# Patient Record
Sex: FEMALE | Race: BLACK OR AFRICAN AMERICAN | NOT HISPANIC OR LATINO | Employment: FULL TIME | URBAN - METROPOLITAN AREA
[De-identification: names, ages, dates, MRNs, and addresses within clinical notes are randomized per-mention and may not be internally consistent; named-entity substitution may affect disease eponyms.]

---

## 2023-03-06 LAB
ALBUMIN (MG/L) IN URINE: 172.5 MG/L
ALBUMIN/CREATININE (UG/MG) IN URINE: 126.8 UG/MG CRT (ref 0–30)
CREATININE (MG/DL) IN URINE: 136 MG/DL (ref 20–320)

## 2023-03-27 DIAGNOSIS — Z00.00 ENCOUNTER FOR GENERAL ADULT MEDICAL EXAMINATION WITHOUT ABNORMAL FINDINGS: ICD-10-CM

## 2023-03-27 DIAGNOSIS — E10.65 TYPE 1 DIABETES MELLITUS WITH HYPERGLYCEMIA (MULTI): ICD-10-CM

## 2023-03-27 DIAGNOSIS — E78.5 HYPERLIPIDEMIA, UNSPECIFIED: ICD-10-CM

## 2023-03-27 RX ORDER — LOVASTATIN 20 MG/1
TABLET ORAL
Qty: 90 TABLET | Refills: 1 | Status: SHIPPED | OUTPATIENT
Start: 2023-03-27 | End: 2023-10-03 | Stop reason: SDUPTHER

## 2023-03-27 RX ORDER — ASPIRIN 81 MG/1
TABLET ORAL
Qty: 90 TABLET | Refills: 1 | Status: SHIPPED | OUTPATIENT
Start: 2023-03-27 | End: 2023-11-28 | Stop reason: SDUPTHER

## 2023-03-27 RX ORDER — LISINOPRIL 2.5 MG/1
TABLET ORAL
Qty: 90 TABLET | Refills: 2 | Status: SHIPPED | OUTPATIENT
Start: 2023-03-27 | End: 2023-10-03 | Stop reason: SDUPTHER

## 2023-05-07 DIAGNOSIS — M54.42 LUMBAGO WITH SCIATICA, LEFT SIDE: ICD-10-CM

## 2023-05-07 DIAGNOSIS — G89.29 OTHER CHRONIC PAIN: ICD-10-CM

## 2023-05-10 RX ORDER — DICLOFENAC SODIUM 10 MG/G
GEL TOPICAL
Qty: 100 G | Refills: 4 | Status: SHIPPED | OUTPATIENT
Start: 2023-05-10

## 2023-07-20 DIAGNOSIS — G89.29 OTHER CHRONIC PAIN: ICD-10-CM

## 2023-07-20 DIAGNOSIS — M54.42 LUMBAGO WITH SCIATICA, LEFT SIDE: ICD-10-CM

## 2023-07-20 RX ORDER — LIDOCAINE 50 MG/G
PATCH TOPICAL
Qty: 30 PATCH | Refills: 1 | Status: SHIPPED | OUTPATIENT
Start: 2023-07-20 | End: 2023-10-03 | Stop reason: SDUPTHER

## 2023-10-03 ENCOUNTER — OFFICE VISIT (OUTPATIENT)
Dept: PRIMARY CARE | Facility: CLINIC | Age: 48
End: 2023-10-03
Payer: COMMERCIAL

## 2023-10-03 ENCOUNTER — LAB (OUTPATIENT)
Dept: LAB | Facility: LAB | Age: 48
End: 2023-10-03
Payer: COMMERCIAL

## 2023-10-03 VITALS
DIASTOLIC BLOOD PRESSURE: 82 MMHG | TEMPERATURE: 98 F | OXYGEN SATURATION: 97 % | RESPIRATION RATE: 16 BRPM | HEART RATE: 60 BPM | SYSTOLIC BLOOD PRESSURE: 137 MMHG

## 2023-10-03 DIAGNOSIS — E78.5 HYPERLIPIDEMIA, UNSPECIFIED: ICD-10-CM

## 2023-10-03 DIAGNOSIS — M54.42 LUMBAGO WITH SCIATICA, LEFT SIDE: ICD-10-CM

## 2023-10-03 DIAGNOSIS — E10.65 TYPE 1 DIABETES MELLITUS WITH HYPERGLYCEMIA (MULTI): ICD-10-CM

## 2023-10-03 DIAGNOSIS — G89.29 OTHER CHRONIC PAIN: ICD-10-CM

## 2023-10-03 DIAGNOSIS — I10 PRIMARY HYPERTENSION: Primary | ICD-10-CM

## 2023-10-03 LAB
ALBUMIN SERPL BCP-MCNC: 3.8 G/DL (ref 3.4–5)
ALP SERPL-CCNC: 77 U/L (ref 33–110)
ALT SERPL W P-5'-P-CCNC: 16 U/L (ref 7–45)
ANION GAP SERPL CALC-SCNC: 13 MMOL/L (ref 10–20)
AST SERPL W P-5'-P-CCNC: 20 U/L (ref 9–39)
BILIRUB SERPL-MCNC: 0.4 MG/DL (ref 0–1.2)
BUN SERPL-MCNC: 10 MG/DL (ref 6–23)
CALCIUM SERPL-MCNC: 9.3 MG/DL (ref 8.6–10.6)
CHLORIDE SERPL-SCNC: 102 MMOL/L (ref 98–107)
CHOLEST SERPL-MCNC: 115 MG/DL (ref 0–199)
CHOLESTEROL/HDL RATIO: 3
CO2 SERPL-SCNC: 28 MMOL/L (ref 21–32)
CREAT SERPL-MCNC: 0.57 MG/DL (ref 0.5–1.05)
CREAT UR-MCNC: 142.3 MG/DL (ref 20–320)
GFR SERPL CREATININE-BSD FRML MDRD: >90 ML/MIN/1.73M*2
GLUCOSE SERPL-MCNC: 135 MG/DL (ref 74–99)
HDLC SERPL-MCNC: 38.2 MG/DL
LDLC SERPL CALC-MCNC: 60 MG/DL (ref 140–190)
MICROALBUMIN UR-MCNC: 95.9 MG/L
MICROALBUMIN/CREAT UR: 67.4 UG/MG CREAT
NON HDL CHOLESTEROL: 77 MG/DL (ref 0–149)
POTASSIUM SERPL-SCNC: 4.1 MMOL/L (ref 3.5–5.3)
PROT SERPL-MCNC: 8 G/DL (ref 6.4–8.2)
SODIUM SERPL-SCNC: 139 MMOL/L (ref 136–145)
TRIGL SERPL-MCNC: 82 MG/DL (ref 0–149)
VLDL: 16 MG/DL (ref 0–40)

## 2023-10-03 PROCEDURE — 4010F ACE/ARB THERAPY RXD/TAKEN: CPT | Performed by: STUDENT IN AN ORGANIZED HEALTH CARE EDUCATION/TRAINING PROGRAM

## 2023-10-03 PROCEDURE — 3075F SYST BP GE 130 - 139MM HG: CPT | Performed by: STUDENT IN AN ORGANIZED HEALTH CARE EDUCATION/TRAINING PROGRAM

## 2023-10-03 PROCEDURE — 99214 OFFICE O/P EST MOD 30 MIN: CPT | Performed by: STUDENT IN AN ORGANIZED HEALTH CARE EDUCATION/TRAINING PROGRAM

## 2023-10-03 PROCEDURE — 3079F DIAST BP 80-89 MM HG: CPT | Performed by: STUDENT IN AN ORGANIZED HEALTH CARE EDUCATION/TRAINING PROGRAM

## 2023-10-03 PROCEDURE — 36415 COLL VENOUS BLD VENIPUNCTURE: CPT

## 2023-10-03 RX ORDER — LIDOCAINE 50 MG/G
1 PATCH TOPICAL DAILY
Qty: 30 PATCH | Refills: 3 | Status: SHIPPED | OUTPATIENT
Start: 2023-10-03 | End: 2023-11-20 | Stop reason: SDUPTHER

## 2023-10-03 RX ORDER — LOVASTATIN 20 MG/1
20 TABLET ORAL DAILY
Qty: 90 TABLET | Refills: 1 | Status: SHIPPED | OUTPATIENT
Start: 2023-10-03 | End: 2023-11-20 | Stop reason: SDUPTHER

## 2023-10-03 RX ORDER — LISINOPRIL 2.5 MG/1
2.5 TABLET ORAL DAILY
Qty: 90 TABLET | Refills: 2 | Status: SHIPPED | OUTPATIENT
Start: 2023-10-03 | End: 2023-11-20 | Stop reason: SDUPTHER

## 2023-10-03 RX ORDER — DOCUSATE SODIUM 100 MG/1
100 CAPSULE, LIQUID FILLED ORAL 2 TIMES DAILY PRN
COMMUNITY
Start: 2022-11-02 | End: 2023-11-28 | Stop reason: SDUPTHER

## 2023-10-03 ASSESSMENT — ENCOUNTER SYMPTOMS
DIZZINESS: 0
CONSTIPATION: 0
SHORTNESS OF BREATH: 0
WHEEZING: 0
VOMITING: 0
HEMATURIA: 0
FEVER: 0
ACTIVITY CHANGE: 0
ABDOMINAL PAIN: 0
NAUSEA: 0
SPEECH DIFFICULTY: 0
COUGH: 0
WEAKNESS: 0
NUMBNESS: 0
DYSURIA: 0
BACK PAIN: 1

## 2023-10-03 NOTE — PROGRESS NOTES
Subjective   Patient ID: Suad Beck is a 48 y.o. female who presents for Follow-up.  HPI  Ms. Beck is here for a follow-up.  Ongoing chronic back pain.  Reports some pain relief with lidocaine patches.  Has not been to physical therapy  No other complaints endorsed  Past Medical History:   Diagnosis Date    Cramp and spasm 05/06/2015    Muscle cramps    Hidradenitis suppurativa 04/04/2022    Hidradenitis    Type 1 diabetes mellitus with hyperglycemia (CMS/[facility]) 05/06/2015    Type 1 diabetes mellitus with hyperglycemia      No past surgical history on file.   No family history on file.   Allergies   Allergen Reactions    Amoxicillin Other          Occupation:     Review of Systems   Constitutional:  Negative for activity change and fever.   HENT:  Negative for congestion.    Respiratory:  Negative for cough, shortness of breath and wheezing.    Cardiovascular:  Negative for chest pain and leg swelling.   Gastrointestinal:  Negative for abdominal pain, constipation, nausea and vomiting.   Endocrine: Negative for cold intolerance.   Genitourinary:  Negative for dysuria, hematuria and urgency.   Musculoskeletal:  Positive for back pain.   Neurological:  Negative for dizziness, speech difficulty, weakness and numbness.   Psychiatric/Behavioral:  Negative for self-injury and suicidal ideas.        Objective   Visit Vitals  /82   Pulse 60   Temp 36.7 °C (98 °F)   Resp 16   SpO2 97%      Physical Exam  Constitutional:       Appearance: Normal appearance.   HENT:      Head: Normocephalic and atraumatic.      Nose: Nose normal.      Mouth/Throat:      Mouth: Mucous membranes are moist.   Eyes:      Conjunctiva/sclera: Conjunctivae normal.      Pupils: Pupils are equal, round, and reactive to light.   Cardiovascular:      Rate and Rhythm: Normal rate and regular rhythm.      Pulses: Normal pulses.      Heart sounds: Normal heart sounds.   Pulmonary:      Effort: Pulmonary effort is normal.      Breath sounds: Normal  breath sounds.   Musculoskeletal:      Cervical back: Neck supple.      Comments: no point tenderness along palpation of entire spine  no paraspinal tenderness  SLR negative  neurological exam for motor, sensory and reflexes normal and equal bilaterlly  no f/o saddle anaesthesia    Skin:     General: Skin is warm.   Neurological:      General: No focal deficit present.      Mental Status: She is alert and oriented to person, place, and time.   Psychiatric:         Mood and Affect: Mood normal.         Behavior: Behavior normal.         Thought Content: Thought content normal.         Judgment: Judgment normal.         Assessment/Plan     Problem List Items Addressed This Visit    None  Visit Diagnoses       Primary hypertension    -  Primary    Lumbago with sciatica, left side        Relevant Medications    lidocaine (Lidoderm) 5 % patch    Other chronic pain        Relevant Medications    lidocaine (Lidoderm) 5 % patch    Type 1 diabetes mellitus with hyperglycemia (CMS/HCC)        Relevant Medications    lidocaine (Lidoderm) 5 % patch    lisinopril 2.5 mg tablet    lovastatin (Mevacor) 20 mg tablet    Other Relevant Orders    Hemoglobin A1C    Albumin , Urine Random    Creatinine, Urine Random    Lipid Panel    Comprehensive Metabolic Panel    TSH with reflex to Free T4 if abnormal    CBC    Hyperlipidemia, unspecified        Relevant Medications    lidocaine (Lidoderm) 5 % patch    lisinopril 2.5 mg tablet    lovastatin (Mevacor) 20 mg tablet    Other Relevant Orders    Hemoglobin A1C    Albumin , Urine Random    Creatinine, Urine Random    Lipid Panel    Comprehensive Metabolic Panel    TSH with reflex to Free T4 if abnormal    CBC        Overall patient is here for a follow-up  Hypertension-at goal continue lisinopril 2.5  Diabetes type 1-patient requested for labs.  We placed the orders but she will be following with her endocrinologist for management with medication.  Also for  albuminuria  Hyperlipidemia-continue lovastatin  Chronic back pain-we advised physical therapy but patient reports she is busy with work and cannot go to the appointments now.  She will call us back.  Lidocaine patches refilled     Has an endocrinoogy

## 2023-10-04 LAB
ERYTHROCYTE [DISTWIDTH] IN BLOOD BY AUTOMATED COUNT: 15.2 % (ref 11.5–14.5)
EST. AVERAGE GLUCOSE BLD GHB EST-MCNC: 180 MG/DL
HBA1C MFR BLD: 7.9 %
HCT VFR BLD AUTO: 35.7 % (ref 36–46)
HGB BLD-MCNC: 11.2 G/DL (ref 12–16)
MCH RBC QN AUTO: 26.9 PG (ref 26–34)
MCHC RBC AUTO-ENTMCNC: 31.4 G/DL (ref 32–36)
MCV RBC AUTO: 86 FL (ref 80–100)
NRBC BLD-RTO: 0 /100 WBCS (ref 0–0)
PLATELET # BLD AUTO: 260 X10*3/UL (ref 150–450)
PMV BLD AUTO: 12.8 FL (ref 7.5–11.5)
RBC # BLD AUTO: 4.17 X10*6/UL (ref 4–5.2)
TSH SERPL-ACNC: 1.08 MIU/L (ref 0.44–3.98)
WBC # BLD AUTO: 7.2 X10*3/UL (ref 4.4–11.3)

## 2023-10-12 ENCOUNTER — CLINICAL SUPPORT (OUTPATIENT)
Dept: PRIMARY CARE | Facility: CLINIC | Age: 48
End: 2023-10-12
Payer: COMMERCIAL

## 2023-10-12 PROCEDURE — 90471 IMMUNIZATION ADMIN: CPT | Performed by: STUDENT IN AN ORGANIZED HEALTH CARE EDUCATION/TRAINING PROGRAM

## 2023-10-12 PROCEDURE — 90686 IIV4 VACC NO PRSV 0.5 ML IM: CPT | Performed by: STUDENT IN AN ORGANIZED HEALTH CARE EDUCATION/TRAINING PROGRAM

## 2023-10-26 DIAGNOSIS — T78.40XS ALLERGY, SEQUELA: Primary | ICD-10-CM

## 2023-10-26 RX ORDER — LORATADINE 10 MG/1
10 TABLET ORAL
COMMUNITY
Start: 2021-11-01 | End: 2023-10-26 | Stop reason: SDUPTHER

## 2023-10-30 DIAGNOSIS — E11.9 TYPE 2 DIABETES MELLITUS WITHOUT COMPLICATIONS (MULTI): ICD-10-CM

## 2023-10-30 RX ORDER — LORATADINE 10 MG/1
10 TABLET ORAL
Qty: 30 TABLET | Refills: 1 | Status: SHIPPED | OUTPATIENT
Start: 2023-10-30 | End: 2023-11-24

## 2023-10-30 RX ORDER — BLOOD-GLUCOSE METER
EACH MISCELLANEOUS 4 TIMES DAILY
Qty: 200 STRIP | Refills: 0 | Status: SHIPPED | OUTPATIENT
Start: 2023-10-30 | End: 2023-11-28 | Stop reason: SDUPTHER

## 2023-10-31 RX ORDER — DOXYCYCLINE 100 MG/1
CAPSULE ORAL
Qty: 180 CAPSULE | Refills: 3 | OUTPATIENT
Start: 2023-10-31

## 2023-11-15 ENCOUNTER — TELEMEDICINE (OUTPATIENT)
Dept: DERMATOLOGY | Facility: CLINIC | Age: 48
End: 2023-11-15
Payer: COMMERCIAL

## 2023-11-15 DIAGNOSIS — L73.2 HIDRADENITIS SUPPURATIVA: Primary | ICD-10-CM

## 2023-11-15 PROCEDURE — 99213 OFFICE O/P EST LOW 20 MIN: CPT | Performed by: DERMATOLOGY

## 2023-11-15 RX ORDER — DOXYCYCLINE HYCLATE 100 MG
100 TABLET ORAL 2 TIMES DAILY
Qty: 180 TABLET | Refills: 3 | Status: SHIPPED | OUTPATIENT
Start: 2023-11-15 | End: 2023-12-01 | Stop reason: WASHOUT

## 2023-11-15 NOTE — PROGRESS NOTES
Subjective     Suad Beck is a 48 y.o. female who presents for the following: Hidradenitis Suppurativa. Patient last seen 4/22. Patient has Olivares Stage III HS. Patient is on doxycyline 100 mg BID. Patient states that her HS is very well controlled and has improved markedly compared to previous visit. Patient is also using Dial soap and applying Vaseline to the sites. Occasionally develops draining nodules under axilla and upper thighs     Review of Systems:  No other skin or systemic complaints other than what is documented elsewhere in the note.    The following portions of the chart were reviewed this encounter and updated as appropriate:   Allergies  Meds  Problems  Med Hx  Surg Hx  Fam Hx         Skin Cancer History  No skin cancer on file.      Specialty Problems          Dermatology Problems    Hidradenitis suppurativa        Objective   Well appearing patient in no apparent distress; mood and affect are within normal limits.    A focused skin examination was performed. All findings within normal limits unless otherwise noted below.    Assessment/Plan   1. Hidradenitis suppurativa  Right Axilla  Cribiform scaring in axilla and groin. No active lesions on exam today.     HS Olivares Stage III well controlled on Doxycycline 100mg BID and Dial soap use with active lesions.  - Patient's HS is markedly improved with Doxycycline BID  - Dealing with psychosocial stress following death of her brother.   - Continue Dial soap weekly   - Continue Doxycycline 100mg BID. Take with food. Continue Dial soap  weekly to prevent antibiotic resistance.  - Will send refills for one year.   - RTC in one year     Related Medications  doxycycline (Vibra-Tabs) 100 mg tablet  Take 1 tablet (100 mg) by mouth 2 times a day. Take with a full glass of water and do not lie down for at least 30 minutes after.      Patient will call to schedule for an in person next visit.     José Godinez MD    I saw and evaluated the  patient. I personally obtained the key and critical portions of the history and physical exam or was physically present for key and critical portions performed by the resident/fellow. I reviewed the resident/fellow's documentation and discussed the patient with the resident/fellow. I agree with the resident/fellow's medical decision making as documented in the note and made changes where appropriate.     Alayna Evans MD

## 2023-11-20 ENCOUNTER — TELEPHONE (OUTPATIENT)
Dept: PRIMARY CARE | Facility: CLINIC | Age: 48
End: 2023-11-20
Payer: COMMERCIAL

## 2023-11-20 DIAGNOSIS — M54.42 LUMBAGO WITH SCIATICA, LEFT SIDE: ICD-10-CM

## 2023-11-20 DIAGNOSIS — E10.65 TYPE 1 DIABETES MELLITUS WITH HYPERGLYCEMIA (MULTI): ICD-10-CM

## 2023-11-20 DIAGNOSIS — E78.5 HYPERLIPIDEMIA, UNSPECIFIED: ICD-10-CM

## 2023-11-20 DIAGNOSIS — G89.29 OTHER CHRONIC PAIN: ICD-10-CM

## 2023-11-20 RX ORDER — LIDOCAINE 50 MG/G
1 PATCH TOPICAL DAILY
Qty: 90 PATCH | Refills: 1 | Status: SHIPPED | OUTPATIENT
Start: 2023-11-20 | End: 2023-11-28 | Stop reason: SDUPTHER

## 2023-11-20 RX ORDER — LOVASTATIN 20 MG/1
20 TABLET ORAL DAILY
Qty: 90 TABLET | Refills: 1 | Status: SHIPPED | OUTPATIENT
Start: 2023-11-20 | End: 2023-11-28 | Stop reason: SDUPTHER

## 2023-11-20 RX ORDER — LISINOPRIL 2.5 MG/1
2.5 TABLET ORAL DAILY
Qty: 90 TABLET | Refills: 2 | Status: SHIPPED | OUTPATIENT
Start: 2023-11-20 | End: 2023-11-28 | Stop reason: SDUPTHER

## 2023-11-24 DIAGNOSIS — T78.40XS ALLERGY, SEQUELA: ICD-10-CM

## 2023-11-24 RX ORDER — LORATADINE 10 MG/1
10 TABLET ORAL DAILY
Qty: 90 TABLET | Refills: 1 | Status: SHIPPED | OUTPATIENT
Start: 2023-11-24 | End: 2023-11-28 | Stop reason: SDUPTHER

## 2023-11-28 DIAGNOSIS — K59.09 CHRONIC CONSTIPATION: Primary | ICD-10-CM

## 2023-11-28 DIAGNOSIS — E10.65 TYPE 1 DIABETES MELLITUS WITH HYPERGLYCEMIA (MULTI): ICD-10-CM

## 2023-11-28 DIAGNOSIS — E11.9 TYPE 2 DIABETES MELLITUS WITHOUT COMPLICATIONS (MULTI): ICD-10-CM

## 2023-11-28 DIAGNOSIS — L73.2 HIDRADENITIS SUPPURATIVA: Primary | ICD-10-CM

## 2023-11-28 DIAGNOSIS — E78.5 HYPERLIPIDEMIA, UNSPECIFIED: ICD-10-CM

## 2023-11-28 DIAGNOSIS — T78.40XS ALLERGY, SEQUELA: ICD-10-CM

## 2023-11-28 DIAGNOSIS — G89.29 OTHER CHRONIC PAIN: ICD-10-CM

## 2023-11-28 DIAGNOSIS — M54.42 LUMBAGO WITH SCIATICA, LEFT SIDE: ICD-10-CM

## 2023-11-28 DIAGNOSIS — Z00.00 ENCOUNTER FOR GENERAL ADULT MEDICAL EXAMINATION WITHOUT ABNORMAL FINDINGS: ICD-10-CM

## 2023-11-28 RX ORDER — INSULIN ASPART 100 [IU]/ML
28 INJECTION, SOLUTION INTRAVENOUS; SUBCUTANEOUS
Qty: 75.6 ML | Refills: 0 | Status: SHIPPED | OUTPATIENT
Start: 2023-11-28 | End: 2024-02-26

## 2023-11-28 RX ORDER — LOVASTATIN 20 MG/1
20 TABLET ORAL DAILY
Qty: 90 TABLET | Refills: 1 | Status: SHIPPED | OUTPATIENT
Start: 2023-11-28

## 2023-11-28 RX ORDER — BLOOD-GLUCOSE METER
EACH MISCELLANEOUS 4 TIMES DAILY
COMMUNITY
Start: 2022-07-11 | End: 2023-11-28 | Stop reason: SDUPTHER

## 2023-11-28 RX ORDER — SYRINGE,SAFETY WITH NEEDLE,1ML 25GX1"
SYRINGE (EA) MISCELLANEOUS
Qty: 400 EACH | Refills: 1 | Status: SHIPPED | OUTPATIENT
Start: 2023-11-28

## 2023-11-28 RX ORDER — LORATADINE 10 MG/1
10 TABLET ORAL DAILY
Qty: 90 TABLET | Refills: 1 | Status: SHIPPED | OUTPATIENT
Start: 2023-11-28

## 2023-11-28 RX ORDER — LANCETS 33 GAUGE
EACH MISCELLANEOUS
Qty: 204 EACH | Refills: 1 | Status: SHIPPED | OUTPATIENT
Start: 2023-11-28

## 2023-11-28 RX ORDER — BLOOD-GLUCOSE METER
EACH MISCELLANEOUS
Qty: 400 EACH | Refills: 1 | Status: SHIPPED | OUTPATIENT
Start: 2023-11-28

## 2023-11-28 RX ORDER — SYRINGE,SAFETY WITH NEEDLE,1ML 25GX1"
SYRINGE (EA) MISCELLANEOUS
COMMUNITY
Start: 2015-05-18 | End: 2023-11-28 | Stop reason: SDUPTHER

## 2023-11-28 RX ORDER — LISINOPRIL 2.5 MG/1
2.5 TABLET ORAL DAILY
Qty: 90 TABLET | Refills: 2 | Status: SHIPPED | OUTPATIENT
Start: 2023-11-28

## 2023-11-28 RX ORDER — INSULIN ASPART 100 [IU]/ML
INJECTION, SOLUTION INTRAVENOUS; SUBCUTANEOUS
COMMUNITY
End: 2023-11-28 | Stop reason: SDUPTHER

## 2023-11-28 RX ORDER — LANCETS 33 GAUGE
EACH MISCELLANEOUS
COMMUNITY
Start: 2023-10-30 | End: 2023-11-28 | Stop reason: SDUPTHER

## 2023-11-28 RX ORDER — INSULIN GLARGINE 100 [IU]/ML
INJECTION, SOLUTION SUBCUTANEOUS
COMMUNITY
End: 2023-11-28 | Stop reason: SDUPTHER

## 2023-11-28 RX ORDER — DOCUSATE SODIUM 100 MG/1
100 CAPSULE, LIQUID FILLED ORAL 2 TIMES DAILY PRN
Qty: 60 CAPSULE | Refills: 2 | Status: SHIPPED | OUTPATIENT
Start: 2023-11-28 | End: 2023-11-28 | Stop reason: SDUPTHER

## 2023-11-28 RX ORDER — LIDOCAINE 50 MG/G
1 PATCH TOPICAL DAILY
Qty: 90 PATCH | Refills: 1 | Status: SHIPPED | OUTPATIENT
Start: 2023-11-28 | End: 2024-05-26

## 2023-11-28 RX ORDER — ASPIRIN 81 MG/1
81 TABLET ORAL DAILY
Qty: 90 TABLET | Refills: 1 | Status: SHIPPED | OUTPATIENT
Start: 2023-11-28 | End: 2024-05-26

## 2023-11-28 RX ORDER — INSULIN GLARGINE 100 [IU]/ML
44 INJECTION, SOLUTION SUBCUTANEOUS NIGHTLY
Qty: 39.6 ML | Refills: 0 | Status: SHIPPED | OUTPATIENT
Start: 2023-11-28 | End: 2024-02-26

## 2023-11-28 NOTE — TELEPHONE ENCOUNTER
Patient called stating losing insurance in two days.  Needs a three month supply for her diabetes needs sent to [facility] in [place].  Please call patient at [phone].  Thank you

## 2023-11-28 NOTE — TELEPHONE ENCOUNTER
Spoke to patient  Patient's last office visit was Jan 2023  Patient stated that she did not know she was supposed to follow up and thought Dr. Otero was managing her type 1 diabetes  Dr. Otero explained that she does not managed type 1 diabetes but will order scripts due to time sensitivity.   Patient requesting a refill on test strips- that were not ordered  Explained to patient that this would be the last refill our office can send without seeing patient for appointment. Patient verbalized good understanding.     Test strips pended below

## 2023-11-28 NOTE — PROGRESS NOTES
Patient calls me reporting that her insurance will  in the next 2 to 3 days.  She requests refills for her type I diabetic medications and other medications for a total of 30 days.  I discussed with patient that I usually do not manage type 1 diabetes and patient should get in contact with her endocrinologist.  However given the time sensitiveness of the issue per patient request, I agreed on filling her medication until she can get in touch with her endocrinologist.  [Chart review does show she followed up with an endocrinologist in 2023, advised to follow-up in3 to 6 months 3 to 6 months for review.  But did not follow-up]    She does report she will follow-up with her endocrinologist soon.

## 2023-11-29 RX ORDER — BLOOD-GLUCOSE METER
EACH MISCELLANEOUS
Qty: 300 STRIP | Refills: 0 | Status: SHIPPED | OUTPATIENT
Start: 2023-11-29

## 2023-11-30 RX ORDER — INSULIN ASPART 100 [IU]/ML
INJECTION, SOLUTION INTRAVENOUS; SUBCUTANEOUS
Qty: 50 ML | Refills: 6 | OUTPATIENT
Start: 2023-11-30

## 2023-11-30 RX ORDER — DOCUSATE SODIUM 100 MG/1
100 CAPSULE, LIQUID FILLED ORAL 2 TIMES DAILY PRN
Qty: 60 CAPSULE | Refills: 2 | Status: SHIPPED | OUTPATIENT
Start: 2023-11-30

## 2023-11-30 RX ORDER — INSULIN GLARGINE 100 [IU]/ML
44 INJECTION, SOLUTION SUBCUTANEOUS NIGHTLY
Qty: 39.6 ML | Refills: 0 | Status: SHIPPED | OUTPATIENT
Start: 2023-11-30 | End: 2024-02-28

## 2023-11-30 RX ORDER — BLOOD-GLUCOSE METER
EACH MISCELLANEOUS
Qty: 200 STRIP | Refills: 0 | OUTPATIENT
Start: 2023-11-30

## 2023-11-30 RX ORDER — INSULIN GLARGINE 100 [IU]/ML
INJECTION, SOLUTION SUBCUTANEOUS
Qty: 20 ML | Refills: 6 | OUTPATIENT
Start: 2023-11-30

## 2023-12-01 RX ORDER — DOXYCYCLINE 100 MG/1
CAPSULE ORAL
Qty: 180 CAPSULE | Refills: 3 | Status: SHIPPED | OUTPATIENT
Start: 2023-12-01

## 2024-01-08 ENCOUNTER — APPOINTMENT (OUTPATIENT)
Dept: ENDOCRINOLOGY | Facility: CLINIC | Age: 49
End: 2024-01-08
Payer: COMMERCIAL

## 2025-01-10 NOTE — PROGRESS NOTES
Assessment/Plan   Diagnoses and all orders for this visit:  Women's annual routine gynecological examination  Screening for cervical cancer  -     THINPREP PAP TEST (>30)  Encounter for screening mammogram for malignant neoplasm of breast  -     BI mammo bilateral screening tomosynthesis; Future      Rachel Frye, APRN-CN     Emir Beck is a 49 y.o. female who is here for a routine exam.     Concerns today:  Discussed increasing heaviness of menstrual cycles. She reports cycles remain mostly regular with mild occasional small variation from month to month. Bleeding lasts 4-5 days but over the past 2-3 months she has noticed a significant increase in the volume f bleeding the first three days with an increase in cramping and pelvic pain as well. She reports saturating a pad pert hour with multiple large clots the first 2-3 days of her cycle. The cramping is intermittent and Motrin helps ease the discomfort but does not resolve it completely making normal activities of daily living and work very difficult. No bleeding between cycles. No pelvic pain between cycles. No post coital bleeding. No abnormal vaginal discharge or urinary symptoms. Will plan a pelvic sono asap. Labs today.     No LMP recorded.   Periods are regular every 28-30 days, lasting 4 days.   Dysmenorrhea:severe, occurring first 1-2 days of flow.   Cyclic symptoms include bloating, breast tenderness, and pelvic pain.     Sexual Activity: sexually active, male partners; Patient reports 1 partners in the last 12 months.  Pain with intercourse? No   Loss of desire? No       History of prior STI: none    Current contraception: coitus interruptus    Last pap: 2018  History of abnormal Pap smear: no  Family history of uterine or ovarian cancer: no    Last mammogram: 2022  History of abnormal mammogram: yes - with normal follow up imaging   Family history of breast cancer: no  Menstrual History:  OB History    No obstetric history on file.         Menarche age: 13  No LMP recorded.       Objective   There were no vitals taken for this visit.  Physical Exam  Constitutional:       Appearance: Normal appearance.   Genitourinary:      Rectum normal.      No lesions in the vagina.      Right Labia: No rash, tenderness, lesions, skin changes or Bartholin's cyst.     Left Labia: No tenderness, lesions, skin changes, Bartholin's cyst or rash.     No inguinal adenopathy present in the right or left side.     No vaginal discharge, erythema, tenderness, bleeding or ulceration.      Anterior vaginal prolapse present.     No vaginal atrophy present.       Right Adnexa: not tender, not full and no mass present.     Left Adnexa: tender.     Left Adnexa: not full and no mass present.     Cervical friability present.      No cervical motion tenderness, discharge, lesion, polyp or nabothian cyst.      Uterus is tender.      Uterus is not enlarged, fixed, irregular or prolapsed.      No uterine mass detected.  Breasts:     Breasts are soft.     Right: Normal.      Left: Normal.   Cardiovascular:      Rate and Rhythm: Normal rate and regular rhythm.   Pulmonary:      Effort: Pulmonary effort is normal.      Breath sounds: Normal breath sounds.   Abdominal:      General: Bowel sounds are normal.      Palpations: Abdomen is soft.      Hernia: There is no hernia in the left inguinal area or right inguinal area.   Musculoskeletal:         General: Normal range of motion.      Cervical back: Normal range of motion.   Lymphadenopathy:      Lower Body: No right inguinal adenopathy. No left inguinal adenopathy.   Neurological:      General: No focal deficit present.      Mental Status: She is alert and oriented to person, place, and time. Mental status is at baseline.   Skin:     General: Skin is warm and dry.      Findings: Lesion present.      Comments: Severe Hydradenitis scaring noted in bilateral axilla.    Psychiatric:         Mood and Affect: Mood normal.         Behavior:  Behavior normal.         Thought Content: Thought content normal.         Judgment: Judgment normal.        Labs today  Pelvic sono asap   Annual in 1 year  Rachel Frye, APRN-CNM

## 2025-01-16 ENCOUNTER — APPOINTMENT (OUTPATIENT)
Dept: OBSTETRICS AND GYNECOLOGY | Facility: CLINIC | Age: 50
End: 2025-01-16
Payer: COMMERCIAL

## 2025-01-16 ENCOUNTER — LAB (OUTPATIENT)
Dept: LAB | Facility: LAB | Age: 50
End: 2025-01-16
Payer: COMMERCIAL

## 2025-01-16 VITALS
DIASTOLIC BLOOD PRESSURE: 70 MMHG | HEIGHT: 63 IN | SYSTOLIC BLOOD PRESSURE: 122 MMHG | BODY MASS INDEX: 46.25 KG/M2 | WEIGHT: 261 LBS

## 2025-01-16 DIAGNOSIS — N93.9 ABNORMAL UTERINE BLEEDING (AUB): Primary | ICD-10-CM

## 2025-01-16 DIAGNOSIS — Z12.31 ENCOUNTER FOR SCREENING MAMMOGRAM FOR MALIGNANT NEOPLASM OF BREAST: ICD-10-CM

## 2025-01-16 DIAGNOSIS — D64.9 ANEMIA, UNSPECIFIED TYPE: ICD-10-CM

## 2025-01-16 DIAGNOSIS — N93.9 ABNORMAL UTERINE BLEEDING (AUB): ICD-10-CM

## 2025-01-16 DIAGNOSIS — Z12.4 SCREENING FOR CERVICAL CANCER: ICD-10-CM

## 2025-01-16 DIAGNOSIS — Z01.419 WOMEN'S ANNUAL ROUTINE GYNECOLOGICAL EXAMINATION: Primary | ICD-10-CM

## 2025-01-16 LAB
ERYTHROCYTE [DISTWIDTH] IN BLOOD BY AUTOMATED COUNT: 16.7 % (ref 11.5–14.5)
HCT VFR BLD AUTO: 29.5 % (ref 36–46)
HGB BLD-MCNC: 8.9 G/DL (ref 12–16)
MCH RBC QN AUTO: 24.1 PG (ref 26–34)
MCHC RBC AUTO-ENTMCNC: 30.2 G/DL (ref 32–36)
MCV RBC AUTO: 80 FL (ref 80–100)
NRBC BLD-RTO: 0 /100 WBCS (ref 0–0)
PLATELET # BLD AUTO: 299 X10*3/UL (ref 150–450)
PROLACTIN SERPL-MCNC: 7.1 UG/L (ref 3–20)
RBC # BLD AUTO: 3.7 X10*6/UL (ref 4–5.2)
TSH SERPL-ACNC: 1.61 MIU/L (ref 0.44–3.98)
WBC # BLD AUTO: 8.1 X10*3/UL (ref 4.4–11.3)

## 2025-01-16 PROCEDURE — 88175 CYTOPATH C/V AUTO FLUID REDO: CPT

## 2025-01-16 PROCEDURE — 1036F TOBACCO NON-USER: CPT | Performed by: ADVANCED PRACTICE MIDWIFE

## 2025-01-16 PROCEDURE — 3008F BODY MASS INDEX DOCD: CPT | Performed by: ADVANCED PRACTICE MIDWIFE

## 2025-01-16 PROCEDURE — 88141 CYTOPATH C/V INTERPRET: CPT | Performed by: PATHOLOGY

## 2025-01-16 PROCEDURE — 84443 ASSAY THYROID STIM HORMONE: CPT

## 2025-01-16 PROCEDURE — 87624 HPV HI-RISK TYP POOLED RSLT: CPT

## 2025-01-16 PROCEDURE — 84146 ASSAY OF PROLACTIN: CPT

## 2025-01-16 PROCEDURE — 85027 COMPLETE CBC AUTOMATED: CPT

## 2025-01-16 PROCEDURE — 99386 PREV VISIT NEW AGE 40-64: CPT | Performed by: ADVANCED PRACTICE MIDWIFE

## 2025-01-16 ASSESSMENT — ENCOUNTER SYMPTOMS
PSYCHIATRIC NEGATIVE: 0
HEMATOLOGIC/LYMPHATIC NEGATIVE: 0
MUSCULOSKELETAL NEGATIVE: 0
CONSTITUTIONAL NEGATIVE: 0
RESPIRATORY NEGATIVE: 0
ALLERGIC/IMMUNOLOGIC NEGATIVE: 0
EYES NEGATIVE: 0
CARDIOVASCULAR NEGATIVE: 0
ENDOCRINE NEGATIVE: 0
NEUROLOGICAL NEGATIVE: 0
GASTROINTESTINAL NEGATIVE: 0

## 2025-01-16 ASSESSMENT — PAIN SCALES - GENERAL: PAINLEVEL_OUTOF10: 0-NO PAIN

## 2025-01-17 RX ORDER — FERROUS GLUCONATE 325 MG
38 TABLET ORAL
Qty: 90 TABLET | Refills: 3 | Status: SHIPPED | OUTPATIENT
Start: 2025-01-17 | End: 2026-01-17

## 2025-01-18 NOTE — ADDENDUM NOTE
Addended by: FERNANDO ALFARO on: 1/17/2025 07:35 PM     Modules accepted: Orders     Detail Level: Detailed General Sunscreen Counseling: I recommended a broad spectrum sunscreen with a SPF of 30 or higher.  I explained that SPF 30 sunscreens block approximately 97 percent of the sun's harmful rays.  Sunscreens should be applied at least 15 minutes prior to expected sun exposure and then every 2 hours after that as long as sun exposure continues. If swimming or exercising sunscreen should be reapplied every 45 minutes to an hour after getting wet or sweating.  One ounce, or the equivalent of a shot glass full of sunscreen, is adequate to protect the skin not covered by a bathing suit. I also recommended a lip balm with a sunscreen as well. Sun protective clothing can be used in lieu of sunscreen but must be worn the entire time you are exposed to the sun's rays.

## 2025-01-21 ENCOUNTER — HOSPITAL ENCOUNTER (OUTPATIENT)
Dept: RADIOLOGY | Facility: HOSPITAL | Age: 50
Discharge: HOME | End: 2025-01-21
Payer: COMMERCIAL

## 2025-01-21 DIAGNOSIS — E10.65 TYPE 1 DIABETES MELLITUS WITH HYPERGLYCEMIA (MULTI): ICD-10-CM

## 2025-01-21 DIAGNOSIS — N93.9 ABNORMAL UTERINE BLEEDING (AUB): ICD-10-CM

## 2025-01-21 PROCEDURE — 76856 US EXAM PELVIC COMPLETE: CPT | Performed by: RADIOLOGY

## 2025-01-21 PROCEDURE — 76830 TRANSVAGINAL US NON-OB: CPT

## 2025-01-21 PROCEDURE — 76830 TRANSVAGINAL US NON-OB: CPT | Performed by: RADIOLOGY

## 2025-01-21 RX ORDER — LANCETS 33 GAUGE
EACH MISCELLANEOUS
Qty: 100 EACH | Refills: 1 | Status: SHIPPED | OUTPATIENT
Start: 2025-01-21

## 2025-01-24 ENCOUNTER — TELEPHONE (OUTPATIENT)
Dept: OBSTETRICS AND GYNECOLOGY | Facility: CLINIC | Age: 50
End: 2025-01-24

## 2025-01-24 NOTE — ADDENDUM NOTE
Addended by: FERNANDO ALFARO on: 1/23/2025 10:45 PM     Modules accepted: Orders     Spoke with dad. Would like to try capsules. Pharmacy verified.     Refill Request for: Vyvanse 10mg    Last Seen: 02/15/2024    Last Refilled: 03/28/2024    Upcoming appointments: 11/26/2024    PDMP date checked 05/20/2024

## 2025-01-29 ENCOUNTER — TELEPHONE (OUTPATIENT)
Dept: OBSTETRICS AND GYNECOLOGY | Facility: CLINIC | Age: 50
End: 2025-01-29
Payer: COMMERCIAL

## 2025-01-29 LAB
CYTOLOGY CMNT CVX/VAG CYTO-IMP: NORMAL
HPV HR 12 DNA GENITAL QL NAA+PROBE: NEGATIVE
HPV HR GENOTYPES PNL CVX NAA+PROBE: NEGATIVE
HPV16 DNA SPEC QL NAA+PROBE: NEGATIVE
HPV18 DNA SPEC QL NAA+PROBE: NEGATIVE
LAB AP HPV GENOTYPE QUESTION: YES
LAB AP HPV HR: NORMAL
LABORATORY COMMENT REPORT: NORMAL
PATH REPORT.TOTAL CANCER: NORMAL

## 2025-01-29 NOTE — TELEPHONE ENCOUNTER
Attempted to contact pt to review results of last pelvic ultrasound and schedule surgical consult follow up asap. Unable to reach pt via telephone. Written message sent last week but have not yet heard back and pt is not yet scheduled as recommended. Telephone message left for pt to contact office asap to review results and schedule follow up visit.     Rachel Frye, ALYSSIA-MACARENA

## 2025-01-30 ENCOUNTER — TELEPHONE (OUTPATIENT)
Dept: OBSTETRICS AND GYNECOLOGY | Facility: CLINIC | Age: 50
End: 2025-01-30
Payer: COMMERCIAL

## 2025-02-04 ENCOUNTER — HOSPITAL ENCOUNTER (OUTPATIENT)
Dept: RADIOLOGY | Facility: CLINIC | Age: 50
Discharge: HOME | End: 2025-02-04
Payer: COMMERCIAL

## 2025-02-04 VITALS — BODY MASS INDEX: 46.23 KG/M2 | WEIGHT: 261 LBS

## 2025-02-04 DIAGNOSIS — Z12.31 ENCOUNTER FOR SCREENING MAMMOGRAM FOR MALIGNANT NEOPLASM OF BREAST: ICD-10-CM

## 2025-02-04 PROCEDURE — 77067 SCR MAMMO BI INCL CAD: CPT

## 2025-02-10 ENCOUNTER — HOSPITAL ENCOUNTER (OUTPATIENT)
Dept: RADIOLOGY | Facility: EXTERNAL LOCATION | Age: 50
Discharge: HOME | End: 2025-02-10
Payer: COMMERCIAL

## 2025-03-06 ENCOUNTER — TELEPHONE (OUTPATIENT)
Dept: OBSTETRICS AND GYNECOLOGY | Facility: CLINIC | Age: 50
End: 2025-03-06
Payer: COMMERCIAL

## 2025-04-04 ENCOUNTER — OFFICE VISIT (OUTPATIENT)
Dept: DERMATOLOGY | Facility: CLINIC | Age: 50
End: 2025-04-04
Payer: COMMERCIAL

## 2025-04-04 DIAGNOSIS — L73.2 HIDRADENITIS SUPPURATIVA: Primary | ICD-10-CM

## 2025-04-04 DIAGNOSIS — L81.0 POST-INFLAMMATORY HYPERPIGMENTATION: ICD-10-CM

## 2025-04-04 PROCEDURE — 99214 OFFICE O/P EST MOD 30 MIN: CPT | Performed by: DERMATOLOGY

## 2025-04-04 RX ORDER — DOXYCYCLINE 100 MG/1
100 CAPSULE ORAL 2 TIMES DAILY
Qty: 180 CAPSULE | Refills: 0 | Status: SHIPPED | OUTPATIENT
Start: 2025-04-04 | End: 2025-07-03

## 2025-04-04 RX ORDER — AMMONIUM LACTATE 12 G/100G
LOTION TOPICAL
Qty: 225 G | Refills: 3 | Status: SHIPPED | OUTPATIENT
Start: 2025-04-04

## 2025-04-04 RX ORDER — BENZOYL PEROXIDE 50 MG/ML
LIQUID TOPICAL 2 TIMES WEEKLY
Qty: 142 G | Refills: 3 | Status: SHIPPED | OUTPATIENT
Start: 2025-04-07 | End: 2026-04-07

## 2025-04-04 ASSESSMENT — DERMATOLOGY QUALITY OF LIFE (QOL) ASSESSMENT
WHAT SINGLE SKIN CONDITION LISTED BELOW IS THE PATIENT ANSWERING THE QUALITY-OF-LIFE ASSESSMENT QUESTIONS ABOUT: HIDRADENITIS SUPPURATIVA
RATE HOW BOTHERED YOU ARE BY SYMPTOMS OF YOUR SKIN PROBLEM (EG, ITCHING, STINGING BURNING, HURTING OR SKIN IRRITATION): 0 - NEVER BOTHERED
ARE THERE EXCLUSIONS OR EXCEPTIONS FOR THE QUALITY OF LIFE ASSESSMENT: NO
RATE HOW BOTHERED YOU ARE BY EFFECTS OF YOUR SKIN PROBLEMS ON YOUR ACTIVITIES (EG, GOING OUT, ACCOMPLISHING WHAT YOU WANT, WORK ACTIVITIES OR YOUR RELATIONSHIPS WITH OTHERS): 0 - NEVER BOTHERED
RATE HOW EMOTIONALLY BOTHERED YOU ARE BY YOUR SKIN PROBLEM (FOR EXAMPLE, WORRY, EMBARRASSMENT, FRUSTRATION): 0 - NEVER BOTHERED
DATE THE QUALITY-OF-LIFE ASSESSMENT WAS COMPLETED: 67299

## 2025-04-04 ASSESSMENT — DERMATOLOGY PATIENT ASSESSMENT
DO YOU USE SUNSCREEN: OCCASIONALLY
DO YOU HAVE ANY NEW OR CHANGING LESIONS: YES
DO YOU HAVE IRREGULAR MENSTRUAL CYCLES: NO
ARE YOU AN ORGAN TRANSPLANT RECIPIENT: NO
DO YOU USE A TANNING BED: NO
ARE YOU TRYING TO GET PREGNANT: NO
HAVE YOU HAD OR DO YOU HAVE A STAPH INFECTION: NO
ARE YOU ON BIRTH CONTROL: NO

## 2025-04-04 ASSESSMENT — ITCH NUMERIC RATING SCALE: HOW SEVERE IS YOUR ITCHING?: 0

## 2025-04-04 ASSESSMENT — PATIENT GLOBAL ASSESSMENT (PGA): PATIENT GLOBAL ASSESSMENT: PATIENT GLOBAL ASSESSMENT:  1 - CLEAR

## 2025-04-04 NOTE — PROGRESS NOTES
Emir Beck is a 49 y.o. female who presents for the following: Hidradenitis Suppurativa (Axilla, groin). Last derm visit 11/15/23 for hidradenitis suppurativa.     She had lost her insurance but now she has it again. Her skin is not as bad as it has been in the past. For example, it does not hurt to walk. She was given 30-day supply of doxycyline PO    She was just diagnosed with anemia and fibroids. On iron pills.    Review of Systems:  No other skin or systemic complaints other than what is documented elsewhere in the note.    The following portions of the chart were reviewed this encounter and updated as appropriate:   Tobacco  Allergies  Meds  Problems  Med Hx  Surg Hx         Skin Cancer History  No skin cancer on file.      Specialty Problems          Dermatology Problems    Hidradenitis suppurativa        Objective   Well appearing patient in no apparent distress; mood and affect are within normal limits.    A focused skin examination was performed. All findings within normal limits unless otherwise noted below.    Assessment/Plan          (Lac-Hydrin) 12 % lotion  Apply to affected areas twice daily to darker areas of skin. Do not apply to open or broken skin.      Follow up 3 months hidradenitis suppurativa

## 2025-06-17 ENCOUNTER — TELEPHONE (OUTPATIENT)
Dept: OBSTETRICS AND GYNECOLOGY | Facility: CLINIC | Age: 50
End: 2025-06-17
Payer: COMMERCIAL

## 2025-06-17 NOTE — TELEPHONE ENCOUNTER
Pt verified by name and .  Pt states she had pelvic ultrasound and blood work in January.  It showed fibroids.  Pt states she did not have a period until .  Pt states she is wearing a depends and 3 pads soaking through them,  Pt states she is calling for a pill to stop bleeding.  Pt states she is having abdominal pain, dizziness, lightheadedness, and sob.  Pt advised to have someone drive her to ER.  Pt states she is at the diabetic doctor.   She will call nurse back.  Pt again advised to have someone drive her to ER.

## 2025-06-17 NOTE — TELEPHONE ENCOUNTER
Pr verified by name and .  Nurse called to check on pt.  Pt states she left her dr office, drove home.  Pt states she had blood work done at [facility] today.  She said she made some grit, under heating pad relaxing.  Pt states she feels better.  Pt states she is wearing 2 pads and underwear.  Pt advised to go to Er.  Pt states if she begins to feel bad again she will call 911.

## 2025-06-18 ENCOUNTER — TELEPHONE (OUTPATIENT)
Dept: OBSTETRICS AND GYNECOLOGY | Facility: CLINIC | Age: 50
End: 2025-06-18
Payer: COMMERCIAL

## 2025-06-18 NOTE — TELEPHONE ENCOUNTER
Pt verified by name and .  Nurse calling to check on pt.  Pt states she is feeling a lot better.  Pt states she is no t bleeding as much only wearing one pad.  Pt denies any lightheadedness or dizziness.  Pt states her hemoglobin from [place] done yesterday is 11.5.  Pt given precautions of when to go to ER.  Pt has no questions at this time.

## 2025-06-18 NOTE — TELEPHONE ENCOUNTER
Pt verified by name and .  Pt calling for ocp pills for AUB.  Pt advised she needs an appointment.  Nurse scheduled pt for 2025.  Pt has no questions at this time.

## 2025-06-19 ENCOUNTER — OFFICE VISIT (OUTPATIENT)
Dept: OBSTETRICS AND GYNECOLOGY | Facility: CLINIC | Age: 50
End: 2025-06-19
Payer: COMMERCIAL

## 2025-06-19 VITALS
HEIGHT: 63 IN | DIASTOLIC BLOOD PRESSURE: 84 MMHG | BODY MASS INDEX: 47.84 KG/M2 | SYSTOLIC BLOOD PRESSURE: 100 MMHG | WEIGHT: 270 LBS

## 2025-06-19 DIAGNOSIS — N93.9 ABNORMAL UTERINE BLEEDING (AUB): Primary | ICD-10-CM

## 2025-06-19 DIAGNOSIS — R06.02 SHORTNESS OF BREATH: ICD-10-CM

## 2025-06-19 DIAGNOSIS — D25.0 INTRAMURAL AND SUBMUCOUS LEIOMYOMA OF UTERUS: ICD-10-CM

## 2025-06-19 DIAGNOSIS — D25.1 INTRAMURAL AND SUBMUCOUS LEIOMYOMA OF UTERUS: ICD-10-CM

## 2025-06-19 PROCEDURE — 3008F BODY MASS INDEX DOCD: CPT | Performed by: ADVANCED PRACTICE MIDWIFE

## 2025-06-19 PROCEDURE — 99213 OFFICE O/P EST LOW 20 MIN: CPT | Performed by: ADVANCED PRACTICE MIDWIFE

## 2025-06-19 RX ORDER — MEDROXYPROGESTERONE ACETATE 10 MG/1
10 TABLET ORAL DAILY
Qty: 10 TABLET | Refills: 0 | Status: CANCELLED | OUTPATIENT
Start: 2025-06-19 | End: 2025-06-29

## 2025-06-19 RX ORDER — NORETHINDRONE 5 MG/1
5 TABLET ORAL DAILY
Qty: 30 TABLET | Refills: 11 | Status: SHIPPED | OUTPATIENT
Start: 2025-06-19 | End: 2026-06-19

## 2025-06-19 ASSESSMENT — ENCOUNTER SYMPTOMS
CONSTIPATION: 1
EYES NEGATIVE: 1
FREQUENCY: 0
WEAKNESS: 0
CHILLS: 0
SPEECH DIFFICULTY: 0
PALPITATIONS: 0
HEMATURIA: 0
FATIGUE: 1
SHORTNESS OF BREATH: 1
DYSURIA: 0
RECTAL PAIN: 0
PSYCHIATRIC NEGATIVE: 1
VOMITING: 0
NECK PAIN: 0
HEMATOLOGIC/LYMPHATIC NEGATIVE: 1
FLANK PAIN: 1
FACIAL ASYMMETRY: 0
CHOKING: 0
WOUND: 0
ANAL BLEEDING: 0
BACK PAIN: 1
HEADACHES: 0
UNEXPECTED WEIGHT CHANGE: 0
DIAPHORESIS: 0
ABDOMINAL PAIN: 1
SEIZURES: 0
DIZZINESS: 0
ALLERGIC/IMMUNOLOGIC NEGATIVE: 1
CHEST TIGHTNESS: 1
MYALGIAS: 0
WHEEZING: 0
FEVER: 0
APNEA: 0
TREMORS: 0
JOINT SWELLING: 0
NECK STIFFNESS: 0
DIARRHEA: 0
STRIDOR: 0
DIFFICULTY URINATING: 0
ACTIVITY CHANGE: 0
NUMBNESS: 0
COUGH: 0
ENDOCRINE NEGATIVE: 1
APPETITE CHANGE: 0
NAUSEA: 0
BLOOD IN STOOL: 0
ARTHRALGIAS: 0
COLOR CHANGE: 0
ABDOMINAL DISTENTION: 1
LIGHT-HEADEDNESS: 0

## 2025-06-19 NOTE — PROGRESS NOTES
Subjective   Patient ID: Suad Beck is a 50 y.o. female who presents for Fibroids (FU for fibroids  /[facility] /).  Pt was seen for an annual exam in 1/2025. At that time she c/o very heavy uterine bleeding and pelvic pain. A pelvic sono was performed:     Enlarged myomatous uterus. There is a submucosal component.  Endometrium is distorted. Left adnexal structure with lack of  visualization of the left ovary. This is probably large fibroid  although difficult to say with certainty. Given limitations of the  examination, further evaluation advised with pelvic MRI to better  evaluate the uterus, endometrium and adnexal structures.    MRI not done. Following review of the ultrasound a provider to provider consult was completed with myself and Dr. Fagan who recommended a surgical consult for management of heavy AUB with known fibroid uterus. The patient was informed of the need for transfer to a surgeon to discuss her management options given the severity of her symptoms with an abnormal ultrasound. She declined a surgical consult and presents today with report of continued pelvic and abdominal discomfort primarily located on her right side. She also reports new onset of severe back discomfort making normal activities of daily living difficult. She reports one episode of heavy vaginal bleeding since her last visit reporting passage of multiple large clots but believes she is perimenopausal given irregularity of bleeding. Denies heavy vaginal bleeding in office today. Pt is willing to move forward with MRI as recommended at this time. Will plan Aygestin daily until results are reviewed. Plan 2 week virtual follow up. Will to have a surgical consult as well following MRI.     We discussed new onset of SOB over the past month. Reports SOB with intermittent chest pain with any physical activity. No SOB or CP in office today. BP normotensive. Plan cardiology referral.           Review of Systems   Constitutional:   Positive for fatigue. Negative for activity change, appetite change, chills, diaphoresis, fever and unexpected weight change.   HENT: Negative.     Eyes: Negative.    Respiratory:  Positive for chest tightness and shortness of breath. Negative for apnea, cough, choking, wheezing and stridor.    Cardiovascular:  Positive for chest pain. Negative for palpitations and leg swelling.   Gastrointestinal:  Positive for abdominal distention, abdominal pain and constipation. Negative for anal bleeding, blood in stool, diarrhea, nausea, rectal pain and vomiting.   Endocrine: Negative.    Genitourinary:  Positive for flank pain, pelvic pain and vaginal bleeding. Negative for decreased urine volume, difficulty urinating, dyspareunia, dysuria, enuresis, frequency, genital sores, hematuria, menstrual problem, urgency, vaginal discharge and vaginal pain.   Musculoskeletal:  Positive for back pain. Negative for arthralgias, gait problem, joint swelling, myalgias, neck pain and neck stiffness.   Skin:  Negative for color change, pallor, rash and wound.   Allergic/Immunologic: Negative.    Neurological:  Negative for dizziness, tremors, seizures, syncope, facial asymmetry, speech difficulty, weakness, light-headedness, numbness and headaches.   Hematological: Negative.    Psychiatric/Behavioral: Negative.         Objective   Physical Exam  Constitutional:       Appearance: Normal appearance. She is obese.   HENT:      Head: Normocephalic.   Cardiovascular:      Rate and Rhythm: Normal rate and regular rhythm.   Pulmonary:      Effort: Pulmonary effort is normal.   Musculoskeletal:         General: Normal range of motion.      Cervical back: Normal range of motion.   Skin:     General: Skin is warm and dry.   Neurological:      General: No focal deficit present.      Mental Status: She is alert and oriented to person, place, and time. Mental status is at baseline.   Psychiatric:         Mood and Affect: Mood normal.         Behavior:  Behavior normal.         Thought Content: Thought content normal.         Judgment: Judgment normal.         Assessment/Plan   Diagnoses and all orders for this visit:  Abnormal uterine bleeding (AUB)  -     norethindrone (Aygestin) 5 mg tablet; Take 1 tablet (5 mg) by mouth once daily.  -     MR pelvis w and wo IV contrast; Future  Shortness of breath  -     Referral to Cardiology; Future  Intramural and submucous leiomyoma of uterus  -     MR pelvis w and wo IV contrast; Future    Warning s/s along with when to seek out emergency care reviewed.        Rachel Frye, ALYSSIA-MACARENA 06/19/25 3:40 PM

## 2025-06-24 ENCOUNTER — HOSPITAL ENCOUNTER (OUTPATIENT)
Dept: RADIOLOGY | Facility: HOSPITAL | Age: 50
Discharge: HOME | End: 2025-06-24
Payer: COMMERCIAL

## 2025-06-24 ENCOUNTER — TELEPHONE (OUTPATIENT)
Dept: OBSTETRICS AND GYNECOLOGY | Facility: CLINIC | Age: 50
End: 2025-06-24
Payer: COMMERCIAL

## 2025-06-24 ENCOUNTER — APPOINTMENT (OUTPATIENT)
Dept: RADIOLOGY | Facility: CLINIC | Age: 50
End: 2025-06-24
Payer: COMMERCIAL

## 2025-06-24 DIAGNOSIS — L73.2 HIDRADENITIS SUPPURATIVA: ICD-10-CM

## 2025-06-24 DIAGNOSIS — D25.0 INTRAMURAL AND SUBMUCOUS LEIOMYOMA OF UTERUS: ICD-10-CM

## 2025-06-24 DIAGNOSIS — D25.1 INTRAMURAL AND SUBMUCOUS LEIOMYOMA OF UTERUS: ICD-10-CM

## 2025-06-24 DIAGNOSIS — N93.9 ABNORMAL UTERINE BLEEDING (AUB): ICD-10-CM

## 2025-06-25 RX ORDER — DOXYCYCLINE 100 MG/1
100 CAPSULE ORAL 2 TIMES DAILY
Qty: 60 CAPSULE | Refills: 2 | Status: SHIPPED | OUTPATIENT
Start: 2025-06-25 | End: 2025-09-23

## 2025-06-27 NOTE — TELEPHONE ENCOUNTER
Nurse called patient's listed telephone number [phone], in attempt to get patient scheduled with Dr. Fagan for an EMB and Surgical Consult per Rachel Frye, APRN, CNM. Nurse received patient's voicemail and left a message stating that we would like to get patient scheduled for an appt recommended by Rachel and to call the office back at her earliest convenience. Nurse provided office number and will re-attempt another call.    RADHA Briceno

## 2025-07-03 ENCOUNTER — APPOINTMENT (OUTPATIENT)
Dept: OBSTETRICS AND GYNECOLOGY | Facility: CLINIC | Age: 50
End: 2025-07-03
Payer: COMMERCIAL

## 2025-07-09 ENCOUNTER — APPOINTMENT (OUTPATIENT)
Dept: OBSTETRICS AND GYNECOLOGY | Facility: CLINIC | Age: 50
End: 2025-07-09
Payer: COMMERCIAL

## 2025-07-18 ENCOUNTER — PATIENT MESSAGE (OUTPATIENT)
Dept: OBSTETRICS AND GYNECOLOGY | Facility: CLINIC | Age: 50
End: 2025-07-18

## 2025-07-18 ENCOUNTER — APPOINTMENT (OUTPATIENT)
Dept: DERMATOLOGY | Facility: CLINIC | Age: 50
End: 2025-07-18
Payer: COMMERCIAL

## 2025-07-21 ENCOUNTER — TELEPHONE (OUTPATIENT)
Dept: OBSTETRICS AND GYNECOLOGY | Facility: CLINIC | Age: 50
End: 2025-07-21
Payer: MEDICAID

## 2025-07-21 ENCOUNTER — OFFICE VISIT (OUTPATIENT)
Dept: CARDIOLOGY | Facility: CLINIC | Age: 50
End: 2025-07-21
Payer: MEDICAID

## 2025-07-21 VITALS
BODY MASS INDEX: 47.12 KG/M2 | HEIGHT: 64 IN | SYSTOLIC BLOOD PRESSURE: 141 MMHG | RESPIRATION RATE: 20 BRPM | OXYGEN SATURATION: 97 % | HEART RATE: 78 BPM | WEIGHT: 276 LBS | DIASTOLIC BLOOD PRESSURE: 80 MMHG

## 2025-07-21 DIAGNOSIS — Z86.39 HISTORY OF INSULIN DEPENDENT DIABETES MELLITUS: ICD-10-CM

## 2025-07-21 DIAGNOSIS — I20.9 TYPICAL ANGINA: Primary | ICD-10-CM

## 2025-07-21 DIAGNOSIS — E78.5 DYSLIPIDEMIA: ICD-10-CM

## 2025-07-21 PROCEDURE — 99205 OFFICE O/P NEW HI 60 MIN: CPT | Performed by: INTERNAL MEDICINE

## 2025-07-21 PROCEDURE — 1036F TOBACCO NON-USER: CPT | Performed by: INTERNAL MEDICINE

## 2025-07-21 PROCEDURE — 99212 OFFICE O/P EST SF 10 MIN: CPT

## 2025-07-21 PROCEDURE — 3008F BODY MASS INDEX DOCD: CPT | Performed by: INTERNAL MEDICINE

## 2025-07-21 RX ORDER — ROSUVASTATIN CALCIUM 40 MG/1
40 TABLET, COATED ORAL DAILY
Qty: 90 TABLET | Refills: 3 | Status: SHIPPED | OUTPATIENT
Start: 2025-07-21 | End: 2026-07-21

## 2025-07-21 RX ORDER — ROSUVASTATIN CALCIUM 40 MG/1
40 TABLET, COATED ORAL DAILY
Qty: 90 TABLET | Refills: 3 | Status: SHIPPED | OUTPATIENT
Start: 2025-07-21 | End: 2025-07-21

## 2025-07-21 RX ORDER — METOPROLOL SUCCINATE 50 MG/1
50 TABLET, EXTENDED RELEASE ORAL DAILY
Qty: 90 TABLET | Refills: 3 | Status: SHIPPED | OUTPATIENT
Start: 2025-07-21 | End: 2026-07-21

## 2025-07-21 ASSESSMENT — PATIENT HEALTH QUESTIONNAIRE - PHQ9
2. FEELING DOWN, DEPRESSED OR HOPELESS: NOT AT ALL
1. LITTLE INTEREST OR PLEASURE IN DOING THINGS: NOT AT ALL
SUM OF ALL RESPONSES TO PHQ9 QUESTIONS 1 AND 2: 0

## 2025-07-21 ASSESSMENT — ENCOUNTER SYMPTOMS
DEPRESSION: 0
LOSS OF SENSATION IN FEET: 0
OCCASIONAL FEELINGS OF UNSTEADINESS: 0

## 2025-07-21 ASSESSMENT — PAIN SCALES - GENERAL: PAINLEVEL_OUTOF10: 0-NO PAIN

## 2025-07-21 NOTE — PATIENT INSTRUCTIONS
Please stop taking lovastatin  Please start taking rosuvastatin and metoprolol succinate  Please call radiology to schedule a cardiac CT scan  Follow up with me in clinic in approximately 6-8 weeks

## 2025-07-21 NOTE — PROGRESS NOTES
Referred by Dr. Frye for Shortness of Breath     History Of Present Illness:    Suad Beck is a 50 y.o. female with complex PMH (detailed below) presenting to outpatient cardiology clinic to establish care.    She reports shortness of breath and substernal left-sided chest discomfort that is brought on by less than 4 METS, walking 2 to 300 feet on flat ground.  Symptoms are consistently brought on with exertion and relieved with rest.  Symptoms are now impacting her ability to complete activities of daily living, and are cause of great concern.  Denies symptoms involving her left jaw or arm, no palpitations, presyncope, syncope, or changes in abdominal or lower extremity edema.    Both of her parents and brother  in their 50s, details not otherwise specified.    Past Medical History:  Insulin-dependent diabetes  Former smoker  Sedentary lifestyle  BMI 47  Hypertension  Dyslipidemia    Review of Systems   Constitutional:  No Weight Change, No Fever, No Chills, No Night Sweats, No Fatigue, No Malaise   ENT/Mouth:  No Hearing Changes, No Ear Pain, No Nasal Congestion, No  Sinus Pain, No Hoarseness, No sore throat, No Rhinorrhea, No Swallowing  Difficulty   Eyes:  No Eye Pain, No Swelling, No Redness, No Foreign Body, No Discharge, No Vision Changes   Cardiovascular:  See HPI   Respiratory:  No Cough, No Sputum, No Wheezing, No Smoke Exposure, No Dyspnea   Gastrointestinal:  No Nausea, No Vomiting, No Diarrhea, No  Constipation, No Pain, No Heartburn, No Anorexia, No Dysphagia, No  Hematochezia, No Melena, No Flatulence, No Jaundice   Genitourinary:  No Dysmenorrhea, No DUB, No Dyspareunia, No Dysuria, No  Urinary Frequency, No Hematuria, No Urinary Incontinence, No Urgency,  No Flank Pain, No Urinary Flow Changes   Musculoskeletal:  No Arthralgias, No Myalgias, No Joint Swelling, No  Joint Stiffness, No Back Pain, No Neck Pain, No Injury History   Skin:  No Skin Lesions, No Pruritis, No Hair Changes, No  Breast/Skin Changes, No Nipple Discharge   Neuro:  No Weakness, No Numbness, No Paresthesias, No Loss of  Consciousness, No Syncope, No Dizziness, No Headache, No Coordination  Changes, No Recent Falls   Psych:  No Anxiety/Panic, No Depression, No Insomnia, No Delusions, No Rumination, No SI/HI/AH/VH, No Social Issues,  No Memory Changes, No Violence/Abuse Hx., No Eating Concerns   Heme/Lymph:  No Bruising, No Bleeding, No Transfusions History, No Lymphadenopathy   Endocrine:  No Polyuria, No Polydipsia, No Temperature Intolerance        Past Medical History:  She has a past medical history of Cramp and spasm (05/06/2015), Hidradenitis suppurativa (04/04/2022), and Type 1 diabetes mellitus with hyperglycemia (Multi) (05/06/2015).    She has no past medical history of Personal history of irradiation.    Past Surgical History:  She has a past surgical history that includes Breast biopsy (1995).      Social History:  She reports that she has never smoked. She has never been exposed to tobacco smoke. She has never used smokeless tobacco. She reports current alcohol use. She reports that she does not use drugs.    Family History:  Family History[1]     Allergies:  Glentana    Outpatient Medications:  Current Outpatient Medications   Medication Instructions    ammonium lactate (Lac-Hydrin) 12 % lotion Apply to affected areas twice daily to darker areas of skin. Do not apply to open or broken skin.    benzoyl peroxide 5 % external wash Topical, 2 times weekly, May bleach fabrics, use an old or white towel    blood sugar diagnostic (OneTouch Verio test strips) strip Use 4 times daily as directed    blood sugar diagnostic (OneTouch Verio test strips) strip Use as directed to check blood sugars 3 times per day    diclofenac sodium (Voltaren) 1 % gel gel APPLY 2 GM DAILY AS NEEDED PAIN    docusate sodium (COLACE) 100 mg, oral, 2 times daily PRN    doxycycline (VIBRAMYCIN) 100 mg, oral, 2 times daily, Take with food and at  "least 8 ounces (large glass) of water, do not lie down for 30 minutes after; use sun protection    ferrous gluconate (Fergon) 324 (38 Fe) mg tablet 38 mg of elemental iron, oral, Daily with breakfast    insulin glargine (LANTUS SOLOSTAR U-100 INSULIN) 44 Units, subcutaneous, Nightly    INSULIN LISPRO SUBQ 30 Units    insulin syringe-needle U-100 31G X 5/16\" 1 mL syringe Use with insulin injection 4 times per day    lancets (OneTouch Delica Plus Lancet) 33 gauge misc USE AS DIRECTED THREE TIMES DAILY    Lantus U-100 Insulin 44 Units, subcutaneous, Nightly, Take as directed per insulin instructions.    lisinopril 2.5 mg, oral, Daily    loratadine (CLARITIN) 10 mg, oral, Daily    metoprolol succinate XL (TOPROL-XL) 50 mg, oral, Daily, Do not crush or chew.    norethindrone (AYGESTIN) 5 mg, oral, Daily    NovoLOG U-100 Insulin aspart 28 Units, subcutaneous, 3 times daily (morning, midday, late afternoon), 20 units with meals plus 6-8 with snacks    rosuvastatin (CRESTOR) 40 mg, oral, Daily        Last Recorded Vitals:  Vitals:    07/21/25 1504   BP: 141/80   BP Location: Left arm   Patient Position: Sitting   BP Cuff Size: Adult   Pulse: 78   Resp: 20   SpO2: 97%   Weight: 125 kg (276 lb)   Height: 1.626 m (5' 4\")       Physical Exam:  Limited by body habitus  GEN: calm, cooperative, asking appropriate questions  NEURO: Alert and oriented x3, CN2-12 intact, SILT, Moving all extremities without difficulty  HEENT: atraumatic, no goiter, no JVD, normal uvula   CARDS: PMI is non-displaced, RRR, no MRG  PULM: CTAB, no wheezes / rales / rhonchi   ABD: soft, non-distended, non-tender, non-tympanitic, BS in all 4 quadrants. No hepatosplenomegaly  : unremarkable  SKIN: healthy appearing, normal skin turgor   EXT: atraumatic            Last Labs:  CBC -  Lab Results   Component Value Date    WBC 8.1 01/16/2025    HGB 8.9 (L) 01/16/2025    HCT 29.5 (L) 01/16/2025    MCV 80 01/16/2025     01/16/2025       CMP -  Lab " "Results   Component Value Date    CALCIUM 9.3 10/03/2023    PHOS 2.8 07/08/2019    PROT 8.0 10/03/2023    ALBUMIN 3.8 10/03/2023    AST 20 10/03/2023    ALT 16 10/03/2023    ALKPHOS 77 10/03/2023    BILITOT 0.4 10/03/2023       LIPID PANEL -   Lab Results   Component Value Date    CHOL 115 10/03/2023    HDL 38.2 10/03/2023    CHHDL 3.0 10/03/2023    VLDL 16 10/03/2023    TRIG 82 10/03/2023    NHDL 77 10/03/2023       RENAL FUNCTION PANEL -   Lab Results   Component Value Date    K 4.1 10/03/2023    PHOS 2.8 07/08/2019       Lab Results   Component Value Date    HGBA1C 8.3 (H) 05/19/2025    HGBA1C 7.9 (H) 10/03/2023           Last Cardiology Tests:    ECG:  No results found for this or any previous visit (from the past 4464 hours).      Echo:  Echo Results:  No results found for this or any previous visit from the past 365 days.       Ejection Fractions:  No results found for: \"EF\"    Cath:  No results found for this or any previous visit from the past 365 days.        Stress Test:  Stress Results:  No results found for this or any previous visit from the past 365 days.       Cardiac Imaging:  BI mammo bilateral screening tomosynthesis  Narrative: Interpreted By:  Leonel Contreras,   STUDY:  BI MAMMO BILATERAL SCREENING TOMOSYNTHESIS; 2/4/2025 3:27 pm      ACCESSION NUMBER(S):  [id number]      ORDERING CLINICIAN:  FERNANDO ALFARO      INDICATION:  Screening.      ,Z12.31 Encounter for screening mammogram for malignant neoplasm of  breast      COMPARISON:  06/22/2022, 06/16/2021      FINDINGS:  2D and tomosynthesis images were reviewed at 1 mm slice thickness.      Density:  There are scattered areas of fibroglandular density.      No suspicious masses or calcifications are identified.      Impression: No mammographic evidence of malignancy.      BI-RADS CATEGORY:  BI-RADS Category:  1 Negative.  Recommendation:  Annual Screening.  Recommended Date:  1 Year.  Laterality:  Bilateral.              For any future breast " imaging appointments, please call [phone]  ([phone]).          MACRO:  None      Signed by: Leonel Contreras 2/10/2025 1:15 PM  Dictation workstation:   [id number]  BI transfer of outside films  Outside images for comparison or treatment purposes, not interpreted by    Radiologists.  BI transfer of outside films  Outside images for comparison or treatment purposes, not interpreted by    Radiologists.  BI transfer of outside films  Outside images for comparison or treatment purposes, not interpreted by    Radiologists.  BI transfer of outside films  Outside images for comparison or treatment purposes, not interpreted by    Radiologists.  BI transfer of outside films  Outside images for comparison or treatment purposes, not interpreted by    Radiologists.      Assessment/Plan   This is a 50-year-old female here to establish care reporting acute on chronic typical angina which seems to have progressed in the last 2 months.  Now occurring daily, impacting her ability to complete activities of daily living.  ECG obtained in the office today is normal sinus rhythm with no convincing ST segment changes that would suggest ischemia at rest.    I discussed the risks and benefits of both invasive and non-invasive approaches for the evaluation of exertional chest symptoms with the patient. After thorough consideration, the patient has elected to proceed with a non-invasive upfront approach using coronary CTA. They understand that, depending on the findings, invasive coronary angiography with possible percutaneous coronary intervention may be required in the future.    Please see detailed problem based assessment / plan below    # Insulin-dependent diabetes / Former smoker / Sedentary lifestyle  # BMI 47 / Hypertension / Dyslipidemia  # Progressive typical angina  - start metoprolol succinate 50  - start rosuvastatin 40  - coronary CTA with heartflow  - follow up in approx 6 weeks to discuss results    # Cardiovascular  Health Maintenance  - Physical Activity: Encourage 10-15K steps per day  - Healthy Diet: Avoid high fat and high sodium foods (processed meats / fast foods)  --- consider a mediterranean or DASH diet, emphasizing vegetables, fruits, whole grains, lean proteins  - Avoidance of smoking and smoke exposure    Problem List Items Addressed This Visit    None  Visit Diagnoses         Codes      Typical angina    -  Primary I20.9    Relevant Medications    metoprolol succinate XL (Toprol-XL) 50 mg 24 hr tablet    rosuvastatin (Crestor) 40 mg tablet    Other Relevant Orders    CT angio coronary art with heartflow if score >30%    Follow Up In Cardiology      Dyslipidemia     E78.5    Relevant Medications    rosuvastatin (Crestor) 40 mg tablet    Other Relevant Orders    Follow Up In Cardiology      History of insulin dependent diabetes mellitus     Z86.39    Relevant Medications    rosuvastatin (Crestor) 40 mg tablet    Other Relevant Orders    Follow Up In Cardiology                Time Spent: I spent 40 minutes reviewing medical testing, obtaining medical history and counselling and educating on diagnosis and documenting clinical encounter.   C. Barton Gillombardo, MD  Interventional Cardiology  Pager: [phone]         [1]   Family History  Family history unknown: Yes

## 2025-07-23 ENCOUNTER — HOSPITAL ENCOUNTER (OUTPATIENT)
Dept: RADIOLOGY | Facility: HOSPITAL | Age: 50
Discharge: HOME | End: 2025-07-23
Payer: MEDICAID

## 2025-07-23 VITALS
RESPIRATION RATE: 16 BRPM | BODY MASS INDEX: 47.12 KG/M2 | SYSTOLIC BLOOD PRESSURE: 146 MMHG | HEIGHT: 64 IN | DIASTOLIC BLOOD PRESSURE: 65 MMHG | OXYGEN SATURATION: 98 % | HEART RATE: 63 BPM | WEIGHT: 276 LBS

## 2025-07-23 DIAGNOSIS — I20.9 TYPICAL ANGINA: ICD-10-CM

## 2025-07-23 PROCEDURE — 75574 CT ANGIO HRT W/3D IMAGE: CPT

## 2025-07-23 PROCEDURE — 2500000004 HC RX 250 GENERAL PHARMACY W/ HCPCS (ALT 636 FOR OP/ED): Performed by: STUDENT IN AN ORGANIZED HEALTH CARE EDUCATION/TRAINING PROGRAM

## 2025-07-23 PROCEDURE — 2500000004 HC RX 250 GENERAL PHARMACY W/ HCPCS (ALT 636 FOR OP/ED)

## 2025-07-23 PROCEDURE — 2550000001 HC RX 255 CONTRASTS: Performed by: INTERNAL MEDICINE

## 2025-07-23 PROCEDURE — 2500000001 HC RX 250 WO HCPCS SELF ADMINISTERED DRUGS (ALT 637 FOR MEDICARE OP)

## 2025-07-23 RX ORDER — METOPROLOL TARTRATE 1 MG/ML
5 INJECTION, SOLUTION INTRAVENOUS ONCE AS NEEDED
Status: DISCONTINUED | OUTPATIENT
Start: 2025-07-23 | End: 2025-07-24 | Stop reason: HOSPADM

## 2025-07-23 RX ORDER — METOPROLOL TARTRATE 50 MG/1
100 TABLET ORAL ONCE AS NEEDED
Status: DISCONTINUED | OUTPATIENT
Start: 2025-07-23 | End: 2025-07-24 | Stop reason: HOSPADM

## 2025-07-23 RX ORDER — METOPROLOL TARTRATE 1 MG/ML
5 INJECTION, SOLUTION INTRAVENOUS ONCE
Status: DISCONTINUED | OUTPATIENT
Start: 2025-07-23 | End: 2025-07-24 | Stop reason: HOSPADM

## 2025-07-23 RX ORDER — NITROGLYCERIN 0.4 MG/1
0.8 TABLET SUBLINGUAL ONCE
Status: DISCONTINUED | OUTPATIENT
Start: 2025-07-23 | End: 2025-07-24 | Stop reason: HOSPADM

## 2025-07-23 RX ORDER — METOPROLOL TARTRATE 50 MG/1
100 TABLET ORAL ONCE
Status: DISCONTINUED | OUTPATIENT
Start: 2025-07-23 | End: 2025-07-23

## 2025-07-23 RX ORDER — METOPROLOL TARTRATE 1 MG/ML
5 INJECTION, SOLUTION INTRAVENOUS ONCE AS NEEDED
Status: COMPLETED | OUTPATIENT
Start: 2025-07-23 | End: 2025-07-23

## 2025-07-23 RX ORDER — METOPROLOL TARTRATE 50 MG/1
100 TABLET ORAL ONCE
Status: DISCONTINUED | OUTPATIENT
Start: 2025-07-23 | End: 2025-07-24 | Stop reason: HOSPADM

## 2025-07-23 RX ORDER — NITROGLYCERIN 0.4 MG/1
0.8 TABLET SUBLINGUAL ONCE
Status: COMPLETED | OUTPATIENT
Start: 2025-07-23 | End: 2025-07-23

## 2025-07-23 RX ORDER — METOPROLOL TARTRATE 1 MG/ML
5 INJECTION, SOLUTION INTRAVENOUS ONCE
Status: COMPLETED | OUTPATIENT
Start: 2025-07-23 | End: 2025-07-23

## 2025-07-23 RX ORDER — LORAZEPAM 2 MG/ML
0.5 INJECTION INTRAMUSCULAR EVERY 5 MIN PRN
Status: DISCONTINUED | OUTPATIENT
Start: 2025-07-23 | End: 2025-07-24 | Stop reason: HOSPADM

## 2025-07-23 RX ADMIN — METOROPROLOL TARTRATE 5 MG: 5 INJECTION, SOLUTION INTRAVENOUS at 12:12

## 2025-07-23 RX ADMIN — METOROPROLOL TARTRATE 5 MG: 5 INJECTION, SOLUTION INTRAVENOUS at 12:06

## 2025-07-23 RX ADMIN — METOROPROLOL TARTRATE 5 MG: 5 INJECTION, SOLUTION INTRAVENOUS at 12:01

## 2025-07-23 RX ADMIN — METOROPROLOL TARTRATE 5 MG: 5 INJECTION, SOLUTION INTRAVENOUS at 12:17

## 2025-07-23 RX ADMIN — NITROGLYCERIN 0.8 MG: 0.4 TABLET SUBLINGUAL at 12:34

## 2025-07-23 RX ADMIN — IOHEXOL 75 ML: 350 INJECTION, SOLUTION INTRAVENOUS at 12:29

## 2025-07-23 ASSESSMENT — PAIN - FUNCTIONAL ASSESSMENT: PAIN_FUNCTIONAL_ASSESSMENT: 0-10

## 2025-07-23 ASSESSMENT — PAIN SCALES - GENERAL: PAINLEVEL_OUTOF10: 0 - NO PAIN

## 2025-07-30 ENCOUNTER — APPOINTMENT (OUTPATIENT)
Dept: OBSTETRICS AND GYNECOLOGY | Facility: CLINIC | Age: 50
End: 2025-07-30
Payer: MEDICAID

## 2025-07-30 VITALS — BODY MASS INDEX: 47.79 KG/M2 | WEIGHT: 278.4 LBS | DIASTOLIC BLOOD PRESSURE: 66 MMHG | SYSTOLIC BLOOD PRESSURE: 106 MMHG

## 2025-07-30 DIAGNOSIS — R10.2 FEMALE PELVIC PAIN: ICD-10-CM

## 2025-07-30 DIAGNOSIS — N93.9 ABNORMAL UTERINE BLEEDING (AUB): Primary | ICD-10-CM

## 2025-07-30 DIAGNOSIS — D25.1 INTRAMURAL AND SUBMUCOUS LEIOMYOMA OF UTERUS: ICD-10-CM

## 2025-07-30 DIAGNOSIS — E10.319 TYPE 1 DIABETES MELLITUS WITH RETINOPATHY OF BOTH EYES WITHOUT MACULAR EDEMA, UNSPECIFIED RETINOPATHY SEVERITY: ICD-10-CM

## 2025-07-30 DIAGNOSIS — D25.0 INTRAMURAL AND SUBMUCOUS LEIOMYOMA OF UTERUS: ICD-10-CM

## 2025-07-30 PROBLEM — E66.01 MORBID OBESITY (MULTI): Status: ACTIVE | Noted: 2017-05-23

## 2025-07-30 PROBLEM — M54.32 SCIATICA OF LEFT SIDE: Status: ACTIVE | Noted: 2025-06-02

## 2025-07-30 PROBLEM — H25.11 AGE-RELATED NUCLEAR CATARACT OF RIGHT EYE: Status: ACTIVE | Noted: 2025-07-30

## 2025-07-30 PROBLEM — N95.1 PERIMENOPAUSE: Status: ACTIVE | Noted: 2025-07-30

## 2025-07-30 PROBLEM — L03.012 CELLULITIS OF LEFT MIDDLE FINGER: Status: RESOLVED | Noted: 2021-07-12 | Resolved: 2025-07-30

## 2025-07-30 PROBLEM — E11.319 DIABETIC RETINOPATHY (MULTI): Status: ACTIVE | Noted: 2025-07-30

## 2025-07-30 PROBLEM — H52.4 MYOPIA WITH ASTIGMATISM AND PRESBYOPIA: Status: ACTIVE | Noted: 2025-07-30

## 2025-07-30 PROBLEM — L02.512 ABSCESS OF LEFT MIDDLE FINGER: Status: RESOLVED | Noted: 2021-07-12 | Resolved: 2025-07-30

## 2025-07-30 PROBLEM — H52.4 PRESBYOPIA OF BOTH EYES: Status: ACTIVE | Noted: 2025-07-30

## 2025-07-30 PROBLEM — J30.2 SEASONAL ALLERGIES: Status: ACTIVE | Noted: 2025-07-30

## 2025-07-30 PROBLEM — I10 BENIGN ESSENTIAL HYPERTENSION: Status: ACTIVE | Noted: 2025-07-30

## 2025-07-30 PROBLEM — E04.2 MULTIPLE THYROID NODULES: Status: ACTIVE | Noted: 2025-07-30

## 2025-07-30 PROBLEM — N92.6 IRREGULAR MENSTRUAL CYCLE: Status: ACTIVE | Noted: 2025-07-30

## 2025-07-30 PROBLEM — I83.93 ASYMPTOMATIC VARICOSE VEINS OF BOTH LOWER EXTREMITIES: Status: ACTIVE | Noted: 2025-07-30

## 2025-07-30 PROBLEM — E78.5 HYPERLIPIDEMIA: Status: ACTIVE | Noted: 2025-07-30

## 2025-07-30 PROBLEM — R80.9 PROTEINURIA: Status: ACTIVE | Noted: 2025-07-30

## 2025-07-30 PROBLEM — N95.1 PERIMENOPAUSAL: Status: ACTIVE | Noted: 2025-07-30

## 2025-07-30 PROBLEM — M54.50 LUMBAGO: Status: ACTIVE | Noted: 2025-06-02

## 2025-07-30 PROBLEM — Z86.16 HISTORY OF COVID-19: Status: RESOLVED | Noted: 2020-11-23 | Resolved: 2025-07-30

## 2025-07-30 PROBLEM — K59.00 CONSTIPATION: Status: ACTIVE | Noted: 2025-07-30

## 2025-07-30 PROBLEM — H25.12 AGE-RELATED NUCLEAR CATARACT OF LEFT EYE: Status: ACTIVE | Noted: 2025-07-30

## 2025-07-30 PROBLEM — H52.10 MYOPIA WITH ASTIGMATISM AND PRESBYOPIA: Status: ACTIVE | Noted: 2025-07-30

## 2025-07-30 PROBLEM — H52.209 MYOPIA WITH ASTIGMATISM AND PRESBYOPIA: Status: ACTIVE | Noted: 2025-07-30

## 2025-07-30 PROCEDURE — 3078F DIAST BP <80 MM HG: CPT | Performed by: STUDENT IN AN ORGANIZED HEALTH CARE EDUCATION/TRAINING PROGRAM

## 2025-07-30 PROCEDURE — 1036F TOBACCO NON-USER: CPT | Performed by: STUDENT IN AN ORGANIZED HEALTH CARE EDUCATION/TRAINING PROGRAM

## 2025-07-30 PROCEDURE — 99214 OFFICE O/P EST MOD 30 MIN: CPT | Performed by: STUDENT IN AN ORGANIZED HEALTH CARE EDUCATION/TRAINING PROGRAM

## 2025-07-30 PROCEDURE — 3074F SYST BP LT 130 MM HG: CPT | Performed by: STUDENT IN AN ORGANIZED HEALTH CARE EDUCATION/TRAINING PROGRAM

## 2025-07-30 PROCEDURE — 4010F ACE/ARB THERAPY RXD/TAKEN: CPT | Performed by: STUDENT IN AN ORGANIZED HEALTH CARE EDUCATION/TRAINING PROGRAM

## 2025-07-30 RX ORDER — GABAPENTIN 600 MG/1
600 TABLET ORAL ONCE
OUTPATIENT
Start: 2025-07-30 | End: 2025-07-30

## 2025-07-30 RX ORDER — ACETAMINOPHEN 325 MG/1
975 TABLET ORAL ONCE
OUTPATIENT
Start: 2025-07-30 | End: 2025-07-30

## 2025-07-30 RX ORDER — CELECOXIB 400 MG/1
400 CAPSULE ORAL ONCE
OUTPATIENT
Start: 2025-07-30 | End: 2025-07-30

## 2025-07-30 ASSESSMENT — ENCOUNTER SYMPTOMS: BACK PAIN: 1

## 2025-07-30 ASSESSMENT — PAIN SCALES - GENERAL: PAINLEVEL_OUTOF10: 0-NO PAIN

## 2025-07-30 NOTE — PROGRESS NOTES
Subjective   Patient ID: Suad Beck is a 50 y.o. female who presents for Results (Patient here to discuss mass and biopsy.).  Pt here referred for Fibroids causing AUB, pelvic/back pain, CONNOR, and constipation.    PMH T1DM, HTN, HS     no CS        Review of Systems   Genitourinary:  Positive for menstrual problem and pelvic pain.   Musculoskeletal:  Positive for back pain.   All other systems reviewed and are negative.      Objective   Physical Exam  Vitals reviewed.   Constitutional:       General: She is not in acute distress.     Appearance: She is not ill-appearing.   Pulmonary:      Effort: Pulmonary effort is normal.     Skin:     Coloration: Skin is not pale.     Neurological:      Mental Status: She is alert.         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  Ultrasound imaging reviewed patient with large broad fibroid uterus.  Patient also with comorbid type 1 diabetes on insulin as well as hypertension and hidradenitis suppurativa.  Prior to proceeding with hysterectomy patient will need endometrial sampling.  Offered sampling in the office but patient prefers anesthesia.  Will submit case request for hysteroscopy       Farshad Fagan MD 25 3:05 PM

## 2025-07-31 ENCOUNTER — HOSPITAL ENCOUNTER (OUTPATIENT)
Facility: HOSPITAL | Age: 50
Setting detail: OUTPATIENT SURGERY
End: 2025-07-31
Attending: STUDENT IN AN ORGANIZED HEALTH CARE EDUCATION/TRAINING PROGRAM | Admitting: STUDENT IN AN ORGANIZED HEALTH CARE EDUCATION/TRAINING PROGRAM
Payer: COMMERCIAL

## 2025-07-31 PROBLEM — N93.9 ABNORMAL UTERINE BLEEDING (AUB): Status: ACTIVE | Noted: 2025-07-30

## 2025-08-13 ENCOUNTER — TELEPHONE (OUTPATIENT)
Dept: DERMATOLOGY | Facility: CLINIC | Age: 50
End: 2025-08-13
Payer: COMMERCIAL

## 2025-08-22 ENCOUNTER — TELEPHONE (OUTPATIENT)
Dept: OBSTETRICS AND GYNECOLOGY | Facility: CLINIC | Age: 50
End: 2025-08-22
Payer: COMMERCIAL

## 2025-09-02 DIAGNOSIS — L81.0 POST-INFLAMMATORY HYPERPIGMENTATION: ICD-10-CM

## 2025-09-02 DIAGNOSIS — L73.2 HIDRADENITIS SUPPURATIVA: ICD-10-CM

## 2025-09-02 RX ORDER — AMMONIUM LACTATE 12 G/100G
LOTION TOPICAL
Qty: 225 ML | Refills: 3 | Status: SHIPPED | OUTPATIENT
Start: 2025-09-02

## 2025-09-02 RX ORDER — BENZOYL PEROXIDE 50 MG/ML
LIQUID TOPICAL 2 TIMES WEEKLY
Qty: 148 ML | Refills: 3 | Status: SHIPPED | OUTPATIENT
Start: 2025-09-04 | End: 2026-09-04

## 2025-09-03 ENCOUNTER — TELEMEDICINE (OUTPATIENT)
Dept: OBSTETRICS AND GYNECOLOGY | Facility: CLINIC | Age: 50
End: 2025-09-03
Payer: MEDICAID

## 2025-09-03 ENCOUNTER — APPOINTMENT (OUTPATIENT)
Dept: OBSTETRICS AND GYNECOLOGY | Facility: CLINIC | Age: 50
End: 2025-09-03
Payer: MEDICAID

## 2025-09-03 DIAGNOSIS — E10.65 TYPE 1 DIABETES MELLITUS WITH HYPERGLYCEMIA (MULTI): ICD-10-CM

## 2025-09-03 DIAGNOSIS — N95.1 PERIMENOPAUSE: ICD-10-CM

## 2025-09-03 DIAGNOSIS — N93.9 ABNORMAL UTERINE BLEEDING (AUB): Primary | ICD-10-CM

## 2025-09-03 PROCEDURE — 4010F ACE/ARB THERAPY RXD/TAKEN: CPT | Performed by: STUDENT IN AN ORGANIZED HEALTH CARE EDUCATION/TRAINING PROGRAM

## 2025-09-03 PROCEDURE — 99213 OFFICE O/P EST LOW 20 MIN: CPT | Performed by: STUDENT IN AN ORGANIZED HEALTH CARE EDUCATION/TRAINING PROGRAM

## 2025-09-03 PROCEDURE — 1036F TOBACCO NON-USER: CPT | Performed by: STUDENT IN AN ORGANIZED HEALTH CARE EDUCATION/TRAINING PROGRAM

## 2025-09-04 RX ORDER — LANCETS 33 GAUGE
EACH MISCELLANEOUS
Qty: 100 EACH | Refills: 3 | Status: SHIPPED | OUTPATIENT
Start: 2025-09-04

## 2025-09-09 ENCOUNTER — APPOINTMENT (OUTPATIENT)
Dept: DERMATOLOGY | Facility: CLINIC | Age: 50
End: 2025-09-09
Payer: MEDICAID

## 2025-10-06 ENCOUNTER — APPOINTMENT (OUTPATIENT)
Dept: DERMATOLOGY | Facility: HOSPITAL | Age: 50
End: 2025-10-06
Payer: COMMERCIAL

## 2025-10-23 ENCOUNTER — APPOINTMENT (OUTPATIENT)
Dept: OPHTHALMOLOGY | Facility: CLINIC | Age: 50
End: 2025-10-23
Payer: COMMERCIAL